# Patient Record
Sex: MALE | Race: BLACK OR AFRICAN AMERICAN
[De-identification: names, ages, dates, MRNs, and addresses within clinical notes are randomized per-mention and may not be internally consistent; named-entity substitution may affect disease eponyms.]

---

## 2021-06-30 ENCOUNTER — HOSPITAL ENCOUNTER (EMERGENCY)
Dept: HOSPITAL 61 - ER | Age: 26
Discharge: HOME | End: 2021-06-30
Payer: COMMERCIAL

## 2021-06-30 VITALS — BODY MASS INDEX: 21.91 KG/M2 | WEIGHT: 119.05 LBS | HEIGHT: 62 IN

## 2021-06-30 VITALS — SYSTOLIC BLOOD PRESSURE: 131 MMHG | DIASTOLIC BLOOD PRESSURE: 89 MMHG

## 2021-06-30 DIAGNOSIS — M54.5: ICD-10-CM

## 2021-06-30 DIAGNOSIS — S13.9XXA: Primary | ICD-10-CM

## 2021-06-30 DIAGNOSIS — M25.561: ICD-10-CM

## 2021-06-30 DIAGNOSIS — R51.9: ICD-10-CM
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
## 2021-06-30 PROCEDURE — 72125 CT NECK SPINE W/O DYE: CPT

## 2021-06-30 PROCEDURE — 72131 CT LUMBAR SPINE W/O DYE: CPT

## 2021-06-30 PROCEDURE — 73562 X-RAY EXAM OF KNEE 3: CPT

## 2021-06-30 PROCEDURE — 70450 CT HEAD/BRAIN W/O DYE: CPT
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
